# Patient Record
Sex: MALE | Race: WHITE | Employment: UNEMPLOYED | URBAN - METROPOLITAN AREA
[De-identification: names, ages, dates, MRNs, and addresses within clinical notes are randomized per-mention and may not be internally consistent; named-entity substitution may affect disease eponyms.]

---

## 2020-06-25 ENCOUNTER — HOSPITAL ENCOUNTER (EMERGENCY)
Age: 45
Discharge: HOME | End: 2020-06-25
Attending: EMERGENCY MEDICINE
Payer: MEDICAID

## 2020-06-25 VITALS
RESPIRATION RATE: 16 BRPM | HEART RATE: 97 BPM | SYSTOLIC BLOOD PRESSURE: 135 MMHG | OXYGEN SATURATION: 97 % | HEIGHT: 72 IN | WEIGHT: 190 LBS | BODY MASS INDEX: 25.73 KG/M2 | TEMPERATURE: 97.6 F | DIASTOLIC BLOOD PRESSURE: 94 MMHG

## 2020-06-25 DIAGNOSIS — B35.6 TINEA CRURIS: Primary | ICD-10-CM

## 2020-06-25 DIAGNOSIS — L24.9 IRRITANT HAND DERMATITIS: ICD-10-CM

## 2020-06-25 DIAGNOSIS — K40.90 INGUINAL HERNIA OF LEFT SIDE WITHOUT OBSTRUCTION OR GANGRENE: ICD-10-CM

## 2020-06-25 DIAGNOSIS — L73.9 FOLLICULITIS: ICD-10-CM

## 2020-06-25 PROCEDURE — 99282 EMERGENCY DEPT VISIT SF MDM: CPT

## 2020-06-25 RX ORDER — DEXTROAMPHETAMINE SACCHARATE, AMPHETAMINE ASPARTATE, DEXTROAMPHETAMINE SULFATE AND AMPHETAMINE SULFATE 7.5; 7.5; 7.5; 7.5 MG/1; MG/1; MG/1; MG/1
30 TABLET ORAL 2 TIMES DAILY
COMMUNITY

## 2020-06-25 RX ORDER — CLOTRIMAZOLE AND BETAMETHASONE DIPROPIONATE 10; .64 MG/G; MG/G
CREAM TOPICAL 2 TIMES DAILY
Qty: 45 G | Refills: 1 | Status: SHIPPED | OUTPATIENT
Start: 2020-06-25

## 2020-06-25 RX ORDER — BENZOYL PEROXIDE 100 MG/ML
LIQUID TOPICAL
Qty: 141 G | Refills: 1 | Status: SHIPPED | OUTPATIENT
Start: 2020-06-25

## 2020-06-25 RX ORDER — CEPHALEXIN 500 MG/1
1000 CAPSULE ORAL 3 TIMES DAILY
Qty: 42 CAP | Refills: 0 | Status: SHIPPED | OUTPATIENT
Start: 2020-06-25 | End: 2020-07-02

## 2020-06-25 NOTE — ED PROVIDER NOTES
Dangelo Oliveira is a 53 y.o. male with complaints of possible crabs in his scrotal area for the last few days. Patient had some irritation in his scrotal sac back about 3 months ago which resolved and then started having increased itching and irritation in scrotal sac. He was concerned he thought he saw crab. Patient denies any known sick contacts or sexual encounters. He also has increased bumps along his upper legs. Patient also has dry skin and skin irritation in both palms. 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 Patient does not use gloves consistently and is on his hands    The history is provided by the patient. History reviewed. No pertinent past medical history. History reviewed. No pertinent surgical history. History reviewed. No pertinent family history.     Social History     Socioeconomic History    Marital status: SINGLE     Spouse name: Not on file    Number of children: Not on file    Years of education: Not on file    Highest education level: Not on file   Occupational History    Not on file   Social Needs    Financial resource strain: Not on file    Food insecurity     Worry: Not on file     Inability: Not on file    Transportation needs     Medical: Not on file     Non-medical: Not on file   Tobacco Use    Smoking status: Current Every Day Smoker     Packs/day: 0.50    Smokeless tobacco: Never Used   Substance and Sexual Activity    Alcohol use: Never     Frequency: Never    Drug use: Never    Sexual activity: Not on file   Lifestyle    Physical activity     Days per week: Not on file     Minutes per session: Not on file    Stress: Not on file   Relationships    Social connections     Talks on phone: Not on file     Gets together: Not on file     Attends Mu-ism service: Not on file     Active member of club or organization: Not on file     Attends meetings of clubs or organizations: Not on file     Relationship status: Not on file    Intimate partner violence     Fear of current or ex partner: Not on file     Emotionally abused: Not on file     Physically abused: Not on file     Forced sexual activity: Not on file   Other Topics Concern    Not on file   Social History Narrative    Not on file         ALLERGIES: Patient has no known allergies. Review of Systems   Constitutional: Negative for fever. HENT: Negative for sore throat. Eyes: Negative for discharge and itching. Respiratory: Negative for shortness of breath. Cardiovascular: Negative for chest pain. Gastrointestinal: Negative for abdominal pain. Genitourinary: Negative for decreased urine volume, difficulty urinating, discharge, dysuria, frequency, hematuria, penile pain, penile swelling, testicular pain and urgency. Musculoskeletal: Negative for gait problem. Skin: Positive for rash. Allergic/Immunologic: Negative for immunocompromised state. Neurological: Negative for syncope. Psychiatric/Behavioral: Positive for sleep disturbance. Vitals:    06/25/20 0157   BP: (!) 135/94   Pulse: 97   Resp: 16   Temp: 97.6 °F (36.4 °C)   SpO2: 97%   Weight: 86.2 kg (190 lb)   Height: 6' (1.829 m)            Physical Exam  Vitals signs and nursing note reviewed. Constitutional:       General: He is not in acute distress. Appearance: He is not ill-appearing, toxic-appearing or diaphoretic. HENT:      Head: Normocephalic and atraumatic. Right Ear: External ear normal.      Left Ear: External ear normal.      Nose: Nose normal.      Mouth/Throat:      Pharynx: No oropharyngeal exudate. Eyes:      Conjunctiva/sclera: Conjunctivae normal.   Neck:      Musculoskeletal: Normal range of motion. Cardiovascular:      Rate and Rhythm: Normal rate and regular rhythm. Heart sounds: Normal heart sounds. Pulmonary:      Effort: Pulmonary effort is normal. No respiratory distress.       Breath sounds: Normal breath sounds. Abdominal:      Palpations: Abdomen is soft. Tenderness: There is no abdominal tenderness. Musculoskeletal: Normal range of motion. Skin:     General: Skin is warm and dry. Comments: Scrotal sac has a large left inguinal hernia that is reducible. There is skin irritation erythema along with some scaling on the scrotal sac. There is no pustules, vesicles or abscess. Both palms exhibit significant dry skin along but no other lesions    Both upper legs exhibit follicle irritation with very small pustules occasionally but no discrete abscesses or signs of cellulitis   Neurological:      Mental Status: He is alert and oriented to person, place, and time. Psychiatric:         Behavior: Behavior normal.          MDM       Procedures    Vitals:  Patient Vitals for the past 12 hrs:   Temp Pulse Resp BP SpO2   06/25/20 0157 97.6 °F (36.4 °C) 97 16 (!) 135/94 97 %         Medications ordered:   Medications - No data to display      Lab findings:  No results found for this or any previous visit (from the past 12 hour(s)). EKG interpretation by ED Physician:      X-Ray, CT or other radiology findings or impressions:  No orders to display       Progress notes, Consult notes or additional Procedure notes:   3 separate dermatologic issues. Doubt need for any testing here. Discussed with patient need for follow-up. Patient already has surgical follow-up set up for his hernia    I have discussed with patient and/or family/sig other the results, interpretation of any imaging if performed, suspected diagnosis and treatment plan to include instructions regarding the diagnoses listed to which understanding was expressed with all questions answered      Reevaluation of patient:   stable    Disposition:  Diagnosis:   1. Tinea cruris    2. Folliculitis    3. Irritant hand dermatitis    4.  Inguinal hernia of left side without obstruction or gangrene        Disposition: home    Follow-up Information Follow up With Specialties Details Why Contact Info    [facility] Dermatology Schedule an appointment as soon as possible for a visit  [address] [facility] [address]  [address] [place] [address] [address] Dermatology  Schedule an appointment as soon as possible for a visit  [address] [facility]  [address] [facility].            Discharge Medication List as of 6/25/2020  2:13 AM      START taking these medications    Details   clotrimazole-betamethasone (Lotrisone) topical cream Apply  to affected area two (2) times a day. Apply to affected area, Print, Disp-45 g, R-1      cephALEXin (Keflex) 500 mg capsule Take 2 Caps by mouth three (3) times daily for 7 days. , Print, Disp-42 Cap, R-0      benzoyl peroxide 10 % clsr topical cleanser Wash affected area on legs daily until cleared up, Print, Disp-141 g, R-1         CONTINUE these medications which have NOT CHANGED    Details   dextroamphetamine-amphetamine (AdderalL) 30 mg tablet Take 30 mg by mouth two (2) times a day., Historical Med

## 2020-06-25 NOTE — DISCHARGE INSTRUCTIONS
Hernia: Care Instructions  Your Care Instructions     A hernia develops when tissue bulges through a weak spot in the wall of your belly. The groin area and the navel are common areas for a hernia. A hernia can also develop near the area of a surgery you had before. Pressure from lifting, straining, or coughing can tear the weak area, causing the hernia to bulge and be painful. If you cannot push a hernia back into place, the tissue may become trapped outside the belly wall. If the hernia gets twisted and loses its blood supply, it will swell and die. This is called a strangulated hernia. It usually causes a lot of pain. It needs treatment right away. Some hernias need to be repaired to prevent a strangulated hernia. If your hernia causes symptoms or is large, you may need surgery. Follow-up care is a key part of your treatment and safety. Be sure to make and go to all appointments, and call your doctor if you are having problems. It's also a good idea to know your test results and keep a list of the medicines you take. How can you care for yourself at home? · Take care when lifting heavy objects. · Stay at a healthy weight. · Do not smoke. Smoking can cause coughing, which can cause your hernia to bulge. If you need help quitting, talk to your doctor about stop-smoking programs and medicines. These can increase your chances of quitting for good. · Talk with your doctor before wearing a corset or truss for a hernia. These devices are not recommended for treating hernias and sometimes can do more harm than good. There may be certain situations when your doctor thinks a truss would work, but these are rare. When should you call for help? Call your doctor now or seek immediate medical care if:  · You have new or worse belly pain. · You are vomiting. · You cannot pass stools or gas. · You cannot push the hernia back into place with gentle pressure when you are lying down.   · The area over the hernia turns red or becomes tender. Watch closely for changes in your health, and be sure to contact your doctor if you have any problems. Where can you learn more? Go to http://hi-gregorio.info/  Enter C129 in the search box to learn more about \"Hernia: Care Instructions. \"  Current as of: August 12, 2019               Content Version: 12.5  © 4084-2619 [employer]. Care instructions adapted under license by [employer] (which disclaims liability or warranty for this information). If you have questions about a medical condition or this instruction, always ask your healthcare professional. [place] [address] any warranty or liability for your use of this information. Patient Education        Dermatitis: Care Instructions  Your Care Instructions  Dermatitis is the general name used for any rash or inflammation of the skin. Different kinds of dermatitis cause different kinds of rashes. Common causes of a rash include new medicines, plants (such as poison oak or poison ivy), heat, and stress. Certain illnesses can also cause a rash. An allergic reaction to something that touches your skin, such as latex, nickel, or poison ivy, is called contact dermatitis. Contact dermatitis may also be caused by something that irritates the skin, such as bleach, a chemical, or soap. These types of rashes cannot be spread from person to person. How long your rash will last depends on what caused it. Rashes may last a few days or months. Follow-up care is a key part of your treatment and safety. Be sure to make and go to all appointments, and call your doctor if you are having problems. It's also a good idea to know your test results and keep a list of the medicines you take. How can you care for yourself at home? · Do not scratch the rash. Cut your nails short, and file them smooth. Or wear gloves if this helps keep you from scratching. · Wash the area with water only. Pat dry. · Put cold, wet cloths on the rash to reduce itching. · Keep cool, and stay out of the sun. · Leave the rash open to the air as much as possible. · If the rash itches, use hydrocortisone cream. Follow the directions on the label. Calamine lotion may help for plant rashes. · Take an over-the-counter antihistamine, such as diphenhydramine (Benadryl) or loratadine (Claritin), to help calm the itching. Read and follow all instructions on the label. · If your doctor prescribed a cream, use it as directed. If your doctor prescribed medicine, take it exactly as directed. When should you call for help? Call your doctor now or seek immediate medical care if:  · You have symptoms of infection, such as:  ? Increased pain, swelling, warmth, or redness. ? Red streaks leading from the area. ? Pus draining from the area. ? A fever. · You have joint pain along with the rash. Watch closely for changes in your health, and be sure to contact your doctor if:  · Your rash is changing or getting worse. · You are not getting better as expected. Where can you learn more? Go to http://hi-gregorio.info/  Enter F270 in the search box to learn more about \"Dermatitis: Care Instructions. \"  Current as of: October 31, 2019               Content Version: 12.5  © 3377-0548 [employer]. Care instructions adapted under license by [employer] (which disclaims liability or warranty for this information). If you have questions about a medical condition or this instruction, always ask your healthcare professional. [place] [address] any warranty or liability for your use of this information. Patient Education        Folliculitis: Care Instructions  Your Care Instructions     Folliculitis (say \"moh-QXP-xja-LY-tus\") is an infection of the pouches (follicles) in the skin where hair grows.  It can occur on any part of the body, but it is most common on the scalp, face, armpits, and groin. Bacteria, such as those found in a hot tub, can cause folliculitis. Folliculitis begins as a red, tender area near a strand of hair. The skin can itch or burn and may drain pus or blood. Sometimes folliculitis can lead to more serious skin infections. Your doctor usually can treat mild folliculitis with an antibiotic cream or ointment. If you have folliculitis on your scalp, you may use a shampoo that kills bacteria. Antibiotics you take as pills can treat infections deeper in the skin. For stubborn cases of folliculitis, laser treatment may be an option. Laser treatment uses strong beams of light to destroy the hair follicle. But hair will no longer grow in the treated area. Follow-up care is a key part of your treatment and safety. Be sure to make and go to all appointments, and call your doctor if you are having problems. It's also a good idea to know your test results and keep a list of the medicines you take. How can you care for yourself at home? · Take your medicine exactly as prescribed. If your doctor prescribed antibiotics, take them as directed. Do not stop taking them just because you feel better. You need to take the full course of antibiotics. · Use a soap that kills bacteria to wash the infected area. If your scalp or beard is infected, use a shampoo with selenium or propylene glycol. Be careful. Do not scrub too long or too hard. · Mix 1 1/3 cup warm water and 1 tablespoon vinegar. Soak a cloth in the mixture, and place it over the infected skin until it cools off (usually 5 to 10 minutes). You can do this 3 to 6 times a day. · Do not share your razor, towel, or washcloth. That can spread folliculitis. · Use a new blade in your razor each time you shave to keep from re-infecting your skin. · If you tend to get folliculitis, avoid using hot tubs. They can contain bacteria that cause folliculitis. When should you call for help?    Call your doctor now or seek immediate medical care if:  · You have symptoms of infection, such as:  ? Increased pain, swelling, warmth, or redness. ? Red streaks leading from the area. ? Pus draining from the area. ? A fever. Watch closely for changes in your health, and be sure to contact your doctor if:  · You do not get better as expected. Where can you learn more? Go to http://hi-gregorio.info/  Enter M257 in the search box to learn more about \"Folliculitis: Care Instructions. \"  Current as of: October 31, 2019               Content Version: 12.5  © 2810-1631 [employer]. Care instructions adapted under license by [employer] (which disclaims liability or warranty for this information). If you have questions about a medical condition or this instruction, always ask your healthcare professional. [address] any warranty or liability for your use of this information. Patient Education        Jock Itch: Care Instructions  Your Care Instructions  Jock itch is a fungal infection of the groin. The fungus that causes jock itch lives on your skin. It often affects male athletes, but anyone can get jock itch. You may get an itchy rash on your inner thighs and rear end (buttocks). It spreads and starts to itch when you sweat or are in steamy showers or locker rooms. Jock itch should end soon if you keep your skin dry after you clean it. You can treat jock itch at home with antifungal creams and powders that you can buy without a prescription. Follow-up care is a key part of your treatment and safety. Be sure to make and go to all appointments, and call your doctor if you are having problems. It's also a good idea to know your test results and keep a list of the medicines you take. How can you care for yourself at home? · Wash the rash with soap and water. · Wet a soft cloth with cool water alone or mixed with baking soda or essential oils such as lavender or carlitos. Put the cool compress on the skin to relieve itching. · If you have large areas of blisters or sores, use compresses such as those made with Burow's solution (available without a prescription) to soothe and dry out the blisters. · Spread antifungal cream or powder over, and beyond the edge of, the rash. Follow the directions on the package. · If your doctor prescribed medicine, take it exactly as directed. Call your doctor if you have any problems with your medicine. · Try not to scratch the rash. · Shower or bathe daily and after you exercise. · Keep your skin dry as much as possible to allow it to heal.  · Until your jock itch is cured, wear loose-fitting cotton clothing. Avoid tight underwear, pants, and panty hose. · Wash your supporters and shorts after every wearing. · Do not share clothing, sports equipment, towels, or sheets to avoid spreading the fungi to other people. To prevent jock itch  · Put on socks before you put on underwear if you have athlete's foot. This action helps prevent the fungus on your feet from spreading to your groin. · Wash your workout clothes, underwear, socks, and towels after each use. · Keep your groin, inner thighs, and buttocks clean and dry, especially after you exercise and shower. · Use a powder on your groin, inner thighs, and buttocks to help keep these areas dry. · Do not borrow or lend clothing, sports equipment, towels, or sheets. · Wear slippers or sandals in locker rooms, showers, and public bathing areas. When should you call for help? Call your doctor now or seek immediate medical care if:  · You have signs of infection, such as:  ? Increased pain, swelling, warmth, or redness. ? Red streaks leading from the rash. ? Pus draining from the rash. ? A fever. Watch closely for changes in your health, and be sure to contact your doctor if:  · You do not get better as expected. Where can you learn more?   Go to http://hi-gregorio.info/  Enter G303 in the search box to learn more about \"Jock Itch: Care Instructions. \"  Current as of: October 31, 2019               Content Version: 12.5  © 3921-2347 [employer]. Care instructions adapted under license by [employer] (which disclaims liability or warranty for this information). If you have questions about a medical condition or this instruction, always ask your healthcare professional. [address] any warranty or liability for your use of this information.

## 2020-06-25 NOTE — ED NOTES
2:22 AM  06/25/20     Discharge instructions given to patient (name) with verbalization of understanding. Patient accompanied by self. Patient discharged with the following prescriptions (multiple). Patient discharged to home (destination).       Eusebio Alcazar RN

## 2020-06-25 NOTE — ED TRIAGE NOTES
Patient states that he thinks he has crabs. States that he has white bumps in his genital area and when you squeeze the bumps, something comes out that looks like it has legs.

## 2021-10-07 PROBLEM — K40.90 INGUINAL HERNIA OF LEFT SIDE WITHOUT OBSTRUCTION OR GANGRENE: Status: ACTIVE | Noted: 2021-10-07

## 2022-03-19 PROBLEM — K40.90 INGUINAL HERNIA OF LEFT SIDE WITHOUT OBSTRUCTION OR GANGRENE: Status: ACTIVE | Noted: 2021-10-07

## 2023-02-01 RX ORDER — CLOTRIMAZOLE AND BETAMETHASONE DIPROPIONATE 10; .64 MG/G; MG/G
CREAM TOPICAL 2 TIMES DAILY
COMMUNITY
Start: 2020-06-25

## 2023-02-01 RX ORDER — DEXTROAMPHETAMINE SACCHARATE, AMPHETAMINE ASPARTATE, DEXTROAMPHETAMINE SULFATE AND AMPHETAMINE SULFATE 7.5; 7.5; 7.5; 7.5 MG/1; MG/1; MG/1; MG/1
30 TABLET ORAL 2 TIMES DAILY
COMMUNITY